# Patient Record
Sex: FEMALE | Race: WHITE | HISPANIC OR LATINO | Employment: FULL TIME | URBAN - METROPOLITAN AREA
[De-identification: names, ages, dates, MRNs, and addresses within clinical notes are randomized per-mention and may not be internally consistent; named-entity substitution may affect disease eponyms.]

---

## 2018-06-19 ENCOUNTER — CONVERSION ENCOUNTER (OUTPATIENT)
Dept: GENERAL RADIOLOGY | Facility: HOSPITAL | Age: 48
End: 2018-06-19

## 2021-05-05 ENCOUNTER — CONVERSION ENCOUNTER (OUTPATIENT)
Dept: ORTHOPEDIC SURGERY | Facility: CLINIC | Age: 51
End: 2021-05-05

## 2021-05-05 ENCOUNTER — OFFICE VISIT CONVERTED (OUTPATIENT)
Dept: ORTHOPEDIC SURGERY | Facility: CLINIC | Age: 51
End: 2021-05-05
Attending: STUDENT IN AN ORGANIZED HEALTH CARE EDUCATION/TRAINING PROGRAM

## 2021-05-14 ENCOUNTER — HOSPITAL ENCOUNTER (OUTPATIENT)
Dept: PERIOP | Facility: HOSPITAL | Age: 51
Setting detail: HOSPITAL OUTPATIENT SURGERY
Discharge: HOME | End: 2021-05-14
Attending: STUDENT IN AN ORGANIZED HEALTH CARE EDUCATION/TRAINING PROGRAM

## 2021-05-14 LAB — HCG UR QL: NEGATIVE

## 2021-05-22 ENCOUNTER — TRANSCRIBE ORDERS (OUTPATIENT)
Dept: ADMINISTRATIVE | Facility: HOSPITAL | Age: 51
End: 2021-05-22

## 2021-05-22 DIAGNOSIS — Z12.31 OTHER SCREENING MAMMOGRAM: Primary | ICD-10-CM

## 2021-06-02 ENCOUNTER — OFFICE VISIT CONVERTED (OUTPATIENT)
Dept: ORTHOPEDIC SURGERY | Facility: CLINIC | Age: 51
End: 2021-06-02
Attending: STUDENT IN AN ORGANIZED HEALTH CARE EDUCATION/TRAINING PROGRAM

## 2021-06-03 NOTE — PROCEDURES
Patient: LUIZA SALOMON     Acct: [id number]     Report: #[id number]  MR #:  [id number]     DOS: 2021 0811     : 1970  DICTATING: Reji Hernandes  ***Signed***  --------------------------------------------------------------------------------------------------------------------  Orthopedic Op/Procedure Note      Date       21            Pre-Operative Diagnosis:      Left shoulder adhesive capsulitis            Post-Operative Diagnosis:      Same as pre-op diagnosis            Surgeon/Assistants      Reji Hernandes MD            OR staff            Anesthesia      General, Block            Procedure Performed/Technique      Left shoulder manipulation under anesthesia, intra articular steroid injection            Specimen/Tissue Removed:      None            Findings:            Left shoulder adhesive capsulitis            Complications:      No            Estimated Blood Loss:      None            Procedure:      Indications: Luiza is a 50 year old female with left shoulder pain dating back     to 2020. Her exam was consistent with adhesive capsulitis. We     discussed treatment options including anti inflammatory medications, physical     therapy, intra articular injection, manipulation under anesthesia and possible     shoulder arthroscopy with capsular release. We discussed the procedural benefits    including improved shoulder motion and quicker return to function. We discussed     procedural risks including bleeding, infection, damage to nerves and blood     vessels, fracture, persistent pain, recurrent stiffness, anesthesia risk, DVT,     and need for additional procedures. After our discussion, Luiza elected to     proceed with left shoulder manipulation under anesthesia, intra articular     steroid injection, and possible arthroscopic capsular release. Written consent     was obtained.             Procedure: The patient was met in the pre operative holding area and the operat     tea extremity was marked. A pre operative peripheral nerve block was performed     by the anesthesia team. The patient was transported to the operative room.     General anesthesia was induced without complication. A timeout was taken to     confirm the appropriate patient, procedure, and procedural site.  All were in     agreement. Exam under anesthesia re demonstrated global loss of left shoulder     motion.  I stabilized the scapula and utilized the Codman's paradox technique to    perform a left shoulder manipulation. My hand was placed in the axilla to     minimize the lever arm. Palpable and audible release of scar/capsule was     appreciated. Post manipulation the left shoulder demonstrated 180 degrees of     elevation and 90 degrees of both internal and external rotation with the arm in     90 degrees of abduction. No expanding hematoma was appreciated. The patient had     a palpable radial pulse. I then cleaned the anterior shoulder with alcohol skin     prep. I performed an intra articular steroid injection with 9 cc of 1% lidocaine    and 80 mg depomedrol without complication. The patient awoke from anesthesia     without complication and was transferred to recovery in stable condition.             Post op: Therapy exercises reviewed. Patient to start formal PT today. DC sling     within one week. Follow up in 2 weeks for re evaluation.            Reji Hernandes                   May 14, 2021 08:05      Electronically signed by Reji Hernandes  05/14/2021 08:11     Disclaimer: Converted hospital document may not contain table formatting or lab diagrams. Please see [employer] for authenticated document.

## 2021-06-03 NOTE — PROCEDURES
Patient: LUIZA SALOMON     Acct: [id number]     Report: #[id number]  MR #:  [id number]     DOS: 2021 0728     : 1970  DICTATING: CAROLYN MCBRIDE  ***Signed***  --------------------------------------------------------------------------------------------------------------------  Anesthesia Blck Procedure Note      DATE: 21      Risks and benefits discussed and accepted with patient to include but not     limited to: bleeding, infection, paresthesia, pain at site of injection, limb     weakness, headaches, allergic reactions to injections, and seizures.            The block or continuous infusion is requested by the referring physician for     management of postoperative pain, or pain related to a procedure.      Diagnosis      Post-Op Pain Management      Block:  Single Interscalene      Site/Location:  Left      Prep Solution:  ChloraPrep      Technique:  Landmarks, Nerve Stimulator, Ultrasound (Deemed medically necessary)      Needle Type & Size:  Insulated 22 gauge Needle      Solution Injected:  0.5% Ropivacaine, 1:200,000 epi      Local Anesthetic Volume:  25 ml      Complications:  None      Comments:      Sterile technique used.  Negative IV test done. Negative heme with aspiration     every 5cc.  No paresthesia with block.  Patient tolerated well.            CAROLYN MCBRIDE                  May 14, 2021 07:28      Electronically signed by CAROLYN MCBRIDE  2021 07:28     Disclaimer: Converted hospital document may not contain table formatting or lab diagrams. Please see [employer] for authenticated document.

## 2021-06-05 NOTE — H&P
History and Physical      Patient Name: Jaimie George   Patient ID: [id number]   Sex: Female   YOB: 1970        Visit Date: May 5, 2021    Provider: Reji Hernandes MD   Location: [facility]   Location Address: [address] [place] [address]  [id number]   Location Phone: [phone]          Chief Complaint  · left shoulder pain      History Of Present Illness  Jaimie George is a 50 year old female who presents today to [facility].      Jaimie presents today for evaluation of her left shoulder.  She describes left shoulder pain that started in September of last year.  She denies any specific injury.  She played volleyball in December and feels like this may have aggravated her pain.  Her pain is along the lateral aspect of the shoulder and radiates down the arm.  She gets pain at night.  She has pain with any attempted reaching or pulling type activities.  She feels very stiff and feels like she has lost a lot of shoulder motion.  She denies any numbness.  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  She is a nondiabetic and a non-smoker.  She has not tried any formal treatment for her shoulder at this time.  She has never had any similar issues in the past.       Medication List  buspirone 5 mg oral tablet; Synthroid 112 mcg oral tablet         Allergy List  PENICILLINS       Allergies Reconciled  Social History  Tobacco (Never)         Review of Systems  · Constitutional  o Denies  o : fever, chills, weight loss  · Cardiovascular  o Denies  o : chest pain, shortness of breath  · Gastrointestinal  o Denies  o : liver disease, heartburn, nausea, blood in stools  · Genitourinary  o Denies  o : painful urination, blood in urine  · Integument  o Denies  o : rash, itching  · Neurologic  o Denies  o : headache, weakness, loss of consciousness  · Musculoskeletal  o Admits  o : Shoulder pain  · Psychiatric  o Denies  o : drug/alcohol addiction, anxiety,  "depression      Vitals  Date Time BP Position Site L\R Cuff Size HR RR TEMP (F) WT  HT  BMI kg/m2 BSA m2 O2 Sat FR L/min FiO2 HC       05/05/2021 03:01 PM      66 - R   152lbs 2oz 5'  7\" 23.83 1.81 99 %  21%          Physical Examination  · Constitutional  o Appearance  o : well developed, well-nourished, no obvious deformities present  · Head and Face  o Head  o :   § Inspection  § : normocephalic  o Face  o :   § Inspection  § : no facial lesions  · Eyes  o Conjunctivae  o : conjunctivae normal  o Sclerae  o : sclerae white  · Ears, Nose, Mouth and Throat  o Ears  o :   § External Ears  § : appearance within normal limits  § Hearing  § : intact  o Nose  o :   § External Nose  § : appearance normal  · Neck  o Inspection/Palpation  o : normal appearance  o Range of Motion  o : full range of motion  · Respiratory  o Respiratory Effort  o : breathing unlabored  o Inspection of Chest  o : normal appearance  o Auscultation of Lungs  o : no audible wheezing or rales  · Cardiovascular  o Heart  o : regular rate  · Gastrointestinal  o Abdominal Examination  o : soft and non-tender  · Skin and Subcutaneous Tissue  o General Inspection  o : intact, no rashes  · Psychiatric  o General  o : Alert and oriented x3  o Judgement and Insight  o : judgment and insight intact  o Mood and Affect  o : mood normal, affect appropriate  · Cervical Spine  o Inspection  o : No pain with cervical range of motion  · Right Shoulder  o Inspection  o : No areas of tenderness. Active elevation 180 degrees. Active external rotation to 50 degrees. Active internal rotation to the lower thoracic spine. 5 out of 5 with rotator cuff testing. Neurovascularly intact.  · Left Shoulder  o Inspection  o : Tender to palpation over the lateral shoulder. No swelling, bruising, or wounds. Active abduction to 30 degrees. Active internal rotation to the SI joint. -5 degrees of active external rotation. Passive motion equal to active motion. Unable to test rotator " cuff given range of motion limitations. No pain with passive elbow range of motion. Sensation intact light touch in the axillary, median, radial, ulnar nerve distributions. Motor function intact with AIN, PIN, ulnar function in the hand. Palpable radial pulse.  · In Office Procedures  o View  o : AP/Grashey/Axillary/Scap Y with template ball  o Site  o : left, shoulder   o Indication  o : Left shoulder pain   o Study  o : X-rays ordered, taken in the office, and reviewed today.  o Xray  o : Axillary, AP, Grashey, Scap Y views of the left shoulder obtained in the office today been reviewed. No fractures are noted. The glenohumeral joint is well aligned. Humeral height appears appropriate. Baseline AC joint arthritis changes. No other acute bony abnormality seen.  o Comparative Data  o : Previously obtained MRI have been reviewed.  · Imaging  o Imaging  o : MRI of the left shoulder from [facility] on 4/3/2021 has been reviewed. Partial-thickness tearing of the supraspinatus tendon at its insertion is noted. No tendon retraction is appreciable. Additional tendinopathy type changes in the infraspinatus tendon are seen. Capsular thickening noted diffusely. Labrum appears intact. Subdeltoid bursitis noted.          Assessment  · Left shoulder pain, unspecified chronicity     719.41/M25.512  · Frozen shoulder     726.0/M75.00  · Partial tear of rotator cuff     840.4/M75.110      Plan  · Orders  o Shoulder (Left) [facility] Preferred View ([id number]) - 719.41/M25.512 - 05/05/2021  · Instructions  o X-rays reviewed by Dr. Hernandes.  o Reviewed the patient's Past Medical, Social, and Family history as well as the ROS at today's visit, no changes.  o Call or return if worsening symptoms.  o Discussed surgery.  o Surgery pamphlet given.  o Jaimie has left shoulder adhesive capsulitis. She also has partial tearing of the supraspinatus tendon noted on her MRI. She is severely limited in her motion as noted above. We discussed  multiple treatment options. We discussed physical therapy and anti-inflammatory medications. We discussed a shoulder manipulation. We discussed an intra-articular shoulder injection. We discussed surgical management with a arthroscopic capsular release. Given Jaimie's duration of symptoms she would like to proceed with a formal intervention. She is interested in a manipulation and steroid injection. She is agreeable to arthroscopic capsular release if adequate motion is not obtained through the manipulation. We will plan for left shoulder manipulation under anesthesia, possible intra-articular steroid injection, and possible arthroscopic capsular release. We discussed the benefit of a manipulation/surgery is improving her shoulder motion. She will require formal physical therapy, likely for 5 days a week initially, to maintain her motion post procedurally. We discussed procedural risks including fracture, persistent pain, recurrent stiffness, bleeding, infection, damage to nerves and blood vessels, chondral injury, anesthesia risk, DVT, need for additional procedures. She again elected to proceed. We will not address the rotator cuff as the recovery/rehab is very different in that setting. If this becomes problematic we can reconsider management of her rotator cuff in the future. We will work on scheduling and coordinating therapy postoperatively.  o Electronically Identified Patient Education Materials Provided Electronically            Electronically Signed by: Reji Hernandes MD -Author on May 7, 2021 01:26:03 PM

## 2021-06-23 ENCOUNTER — OFFICE VISIT (OUTPATIENT)
Dept: ORTHOPEDIC SURGERY | Facility: CLINIC | Age: 51
End: 2021-06-23

## 2021-06-23 VITALS — HEART RATE: 83 BPM | WEIGHT: 148 LBS | OXYGEN SATURATION: 100 % | HEIGHT: 64 IN | BODY MASS INDEX: 25.27 KG/M2

## 2021-06-23 DIAGNOSIS — M25.512 LEFT SHOULDER PAIN, UNSPECIFIED CHRONICITY: ICD-10-CM

## 2021-06-23 DIAGNOSIS — M75.02 ADHESIVE CAPSULITIS OF LEFT SHOULDER: Primary | ICD-10-CM

## 2021-06-23 PROCEDURE — 99024 POSTOP FOLLOW-UP VISIT: CPT | Performed by: STUDENT IN AN ORGANIZED HEALTH CARE EDUCATION/TRAINING PROGRAM

## 2021-06-23 RX ORDER — LEVOTHYROXINE SODIUM 112 UG/1
112 TABLET ORAL DAILY
COMMUNITY
Start: 2021-03-29

## 2021-06-23 RX ORDER — BUSPIRONE HYDROCHLORIDE 5 MG/1
5 TABLET ORAL 2 TIMES DAILY
COMMUNITY
Start: 2021-06-10

## 2021-06-23 NOTE — PROGRESS NOTES
"Chief Complaint  Pain of the Left Shoulder    Subjective          Jaimie George presents to [facility] for   History of Present Illness    Jaimie returns today for follow-up of her left shoulder.  She has left shoulder adhesive capsulitis and underwent a manipulation on 5/14/2021.  Her motion has been improving.  She is still having pain and difficulty with active motion.  Has been attending physical therapy.  Is using Voltaren and tolerating it well.  She denies any numbness.  Denies any swelling.    Allergies   Allergen Reactions   • Latex Swelling   • Penicillins Swelling   • Fumaric Acid-K Bromide-Nickel [Traumeel] Rash        Social History     Socioeconomic History   • Marital status:      Spouse name: Not on file   • Number of children: Not on file   • Years of education: Not on file   • Highest education level: Not on file   Tobacco Use   • Smoking status: Never Smoker   Substance and Sexual Activity   • Alcohol use: Yes     Comment: ocasionaly drinks, 1 drink per day, has been drinking 6-10 years    • Drug use: Never        REVIEW OF SYSTEMS    Constitutional: Denies fevers, chills, weight loss  Cardiovascular: Denies chest pain, shortness of breath  Skin: Denies rashes, acute skin changes  Neurologic: Denies headache, loss of consciousness  MSK: Left shoulder pain      Objective   Vital Signs:   Pulse 83   Ht 162.6 cm (64\")   Wt 67.1 kg (148 lb)   SpO2 100%   BMI 25.40 kg/m²     Body mass index is 25.4 kg/m².    Physical Exam    General: Alert, no acute distress  Left upper extremity: No swelling about the left shoulder.  Tender to palpation over the anterior shoulder.  No bruising.  Passive elevation to 135 degrees, passive external rotation of 40 degrees, passive internal rotation to the SI joint.  5 out of 5 with resisted shoulder abduction.  5 out of 5 with resisted external rotation with the arm at the side.  Sensation intact in the axillary, median, radial, " ulnar nerve distributions.  Motor function intact with AIN, PIN, ulnar nerve function.  Palpable radial pulse.    Procedures    Imaging Results (Most Recent)     None           Result Review :             Assessment and Plan    Diagnoses and all orders for this visit:    1. Adhesive capsulitis of left shoulder (Primary)    2. Left shoulder pain, unspecified chronicity  -     diclofenac (VOLTAREN) 50 MG EC tablet; Take 1 tablet by mouth 2 (Two) Times a Day.  Dispense: 60 tablet; Refill: 1        Jaimie has left shoulder adhesive capsulitis.  She is now approximately 6 weeks status post a manipulation.  Her motion has significantly improved from prior to the manipulation.  She is still painful.  We discussed this is not unexpected given the severity of her initial functional limitations.  A new prescription for Voltaren was provided.  She will continue formal physical therapy.  A work note was provided.  Follow-up in 3 weeks for reevaluation.  No additional x-rays are needed when she returns.      Follow Up   Return in about 3 weeks (around 7/14/2021).  Patient was given instructions and counseling regarding her condition or for health maintenance advice. Please see specific information pulled into the AVS if appropriate.

## 2021-06-24 PROBLEM — M75.02 ADHESIVE CAPSULITIS OF LEFT SHOULDER: Status: ACTIVE | Noted: 2021-06-24

## 2021-07-15 VITALS — OXYGEN SATURATION: 99 % | BODY MASS INDEX: 23.88 KG/M2 | HEIGHT: 67 IN | WEIGHT: 152.12 LBS | HEART RATE: 66 BPM

## 2021-07-15 VITALS — OXYGEN SATURATION: 99 % | BODY MASS INDEX: 25.84 KG/M2 | WEIGHT: 151.37 LBS | HEIGHT: 64 IN | HEART RATE: 67 BPM

## 2021-07-19 ENCOUNTER — OFFICE VISIT (OUTPATIENT)
Dept: ORTHOPEDIC SURGERY | Facility: CLINIC | Age: 51
End: 2021-07-19

## 2021-07-19 VITALS — BODY MASS INDEX: 25.27 KG/M2 | OXYGEN SATURATION: 99 % | HEIGHT: 64 IN | HEART RATE: 66 BPM | WEIGHT: 148 LBS

## 2021-07-19 DIAGNOSIS — M75.02 ADHESIVE CAPSULITIS OF LEFT SHOULDER: Primary | ICD-10-CM

## 2021-07-19 PROCEDURE — 99024 POSTOP FOLLOW-UP VISIT: CPT | Performed by: STUDENT IN AN ORGANIZED HEALTH CARE EDUCATION/TRAINING PROGRAM

## 2021-07-19 NOTE — PROGRESS NOTES
"Chief Complaint  Pain of the Left Shoulder    Subjective          Jaimie George presents to [facility] for follow up on adhesive capsulitis of left shoulder.   History of Present Illness    On 5/14/21 patient had manipulation completed. Continuing therapy 2 days a week.  She states that her fingers continue to go numb with more strenuous activity, but this is intermittent. Pain continuing in left shoulder.  Using oral Voltaren with mild relief.  Patient states left shoulder stiffness feels stable compared to the previous evaluation.. Daily activities continued to be impacted due to pain in left shoulder.   Allergies   Allergen Reactions   • Latex Swelling   • Penicillins Swelling   • Fumaric Acid-K Bromide-Nickel [Traumeel] Rash        Social History     Socioeconomic History   • Marital status:      Spouse name: Not on file   • Number of children: Not on file   • Years of education: Not on file   • Highest education level: Not on file   Tobacco Use   • Smoking status: Never Smoker   • Smokeless tobacco: Never Used   Vaping Use   • Vaping Use: Never used   Substance and Sexual Activity   • Alcohol use: Yes     Comment: ocasionaly drinks, 1 drink per day, has been drinking 6-10 years    • Drug use: Never        REVIEW OF SYSTEMS    Constitutional: Denies fevers, chills, weight loss  Cardiovascular: Denies chest pain, shortness of breath  Skin: Denies rashes, acute skin changes  Neurologic: Denies headache, loss of consciousness  MSK: Pain of left shoulder      Objective   Vital Signs:   Pulse 66   Ht 162.6 cm (64\")   Wt 67.1 kg (148 lb)   SpO2 99%   BMI 25.40 kg/m²     Body mass index is 25.4 kg/m².    Physical Exam    General: Alert, no acute distress   Left upper extremity: Tender over bicep tendon, lateral shoulder, and trapezius muscles. Full elbow flexion and extension. Passively gets to 110 degrees elevation. Active elevation to 80 degrees. Passive external rotation 30 " degrees. Internal rotation to waistline. Sensation intact in the axillary, median, radial, ulnar nerve distributions. Palpable radial pulse.  Full ROM of digits.     Procedures    Imaging Results (Most Recent)     None           Result Review :               Assessment and Plan    Diagnoses and all orders for this visit:    1. Adhesive capsulitis of left shoulder (Primary)  -     naproxen (NAPROSYN) 500 MG tablet; Take 1 tablet by mouth 2 (Two) Times a Day.  Dispense: 60 tablet; Refill: 0          Jaimie remains painful now approximately 2 months status post manipulation and intra-articular injection for left shoulder adhesive capsulitis.  We will switch from Voltaren to naproxen today.  Continuing with therapy. Discussed various options to help decrease or prevent stiffness. Patient expressed understanding. She is to remain off work until the next follow-up visit.  Discussed potential injection next visit. Follow up in 4 weeks.  No x-rays needed when she returns.    Follow Up   Return in about 4 weeks (around 8/16/2021).     Follow up in 4 weeks.  Patient was given instructions and counseling regarding her condition or for health maintenance advice. Please see specific information pulled into the AVS if appropriate.

## 2021-07-21 ENCOUNTER — TELEPHONE (OUTPATIENT)
Dept: ORTHOPEDIC SURGERY | Facility: CLINIC | Age: 51
End: 2021-07-21

## 2021-07-22 RX ORDER — NAPROXEN 500 MG/1
500 TABLET ORAL 2 TIMES DAILY
Qty: 60 TABLET | Refills: 0 | Status: SHIPPED | OUTPATIENT
Start: 2021-07-22 | End: 2021-09-20

## 2021-08-06 ENCOUNTER — HOSPITAL ENCOUNTER (OUTPATIENT)
Dept: MAMMOGRAPHY | Facility: HOSPITAL | Age: 51
Discharge: HOME | End: 2021-08-06
Admitting: INTERNAL MEDICINE

## 2021-08-06 DIAGNOSIS — Z12.31 OTHER SCREENING MAMMOGRAM: ICD-10-CM

## 2021-08-06 PROCEDURE — 77063 BREAST TOMOSYNTHESIS BI: CPT

## 2021-08-06 PROCEDURE — 77067 SCR MAMMO BI INCL CAD: CPT

## 2021-08-06 PROCEDURE — 77063 BREAST TOMOSYNTHESIS BI: CPT | Performed by: RADIOLOGY

## 2021-08-06 PROCEDURE — 77067 SCR MAMMO BI INCL CAD: CPT | Performed by: RADIOLOGY

## 2021-08-23 ENCOUNTER — OFFICE VISIT (OUTPATIENT)
Dept: ORTHOPEDIC SURGERY | Facility: CLINIC | Age: 51
End: 2021-08-23

## 2021-08-23 VITALS — HEIGHT: 64 IN | BODY MASS INDEX: 25.78 KG/M2 | HEART RATE: 87 BPM | WEIGHT: 151 LBS | OXYGEN SATURATION: 99 %

## 2021-08-23 DIAGNOSIS — M25.512 LEFT SHOULDER PAIN, UNSPECIFIED CHRONICITY: ICD-10-CM

## 2021-08-23 DIAGNOSIS — M75.02 ADHESIVE CAPSULITIS OF LEFT SHOULDER: Primary | ICD-10-CM

## 2021-08-23 PROCEDURE — 99213 OFFICE O/P EST LOW 20 MIN: CPT | Performed by: STUDENT IN AN ORGANIZED HEALTH CARE EDUCATION/TRAINING PROGRAM

## 2021-08-23 RX ORDER — METHYLPREDNISOLONE 4 MG/1
1 TABLET ORAL DAILY
Qty: 21 TABLET | Refills: 0 | Status: SHIPPED | OUTPATIENT
Start: 2021-08-23 | End: 2022-06-03

## 2021-08-23 NOTE — PROGRESS NOTES
"Chief Complaint  Follow-up of the Left Shoulder    Subjective          Jaimie George presents to [facility] for   History of Present Illness    Jaimie returns today for left shoulder adhesive capsulitis. She had a manipulation on 5/14/21. States she feels like she is making progress with shoulder elevation. States she is continuing to have pain. Going to therapy 2 times a week. Continuing to use naproxen with no complications but states she feels like diclofenac works better for her. States she is on a 2 lb restriction at therapy until she progresses. States she is still having difficulty with opening and shutting car doors. She states numbness and tingling in fingers with pushing or wrist extension that is intermittent.  She has yet to return to work.    Allergies   Allergen Reactions   • Latex Swelling   • Penicillins Swelling   • Fumaric Acid-K Bromide-Nickel [Traumeel] Rash        Social History     Socioeconomic History   • Marital status:      Spouse name: Not on file   • Number of children: Not on file   • Years of education: Not on file   • Highest education level: Not on file   Tobacco Use   • Smoking status: Never Smoker   • Smokeless tobacco: Never Used   Vaping Use   • Vaping Use: Never used   Substance and Sexual Activity   • Alcohol use: Yes     Comment: ocasionaly drinks, 1 drink per day, has been drinking 6-10 years    • Drug use: Never        I reviewed the patient's chief complaint, history of present illness, review of systems, past medical history, surgical history, family history, social history, medications, and allergy list.     REVIEW OF SYSTEMS    Constitutional: Denies fevers, chills, weight loss  Cardiovascular: Denies chest pain, shortness of breath  Skin: Denies rashes, acute skin changes  Neurologic: Denies headache, loss of consciousness  MSK: follow up of left shoulder      Objective   Vital Signs:   Pulse 87   Ht 162.6 cm (64\")   Wt 68.5 kg " (151 lb)   SpO2 99%   BMI 25.92 kg/m²     Body mass index is 25.92 kg/m².    Physical Exam    General: Alert, no acute distress   Left upper extremity: no swelling or bruising.  Diffuse nonspecific soft tissue tenderness about the shoulder. Active elevation to 90 degrees. Passive elevation 120 degrees. Passive external rotation to 20 degrees. Internal rotation to back pocket. Sensation intact in the axillary, median, radial, ulnar nerve distributions. Palpable radial pulse. Full elbow flexion and extension. 90 degrees pronation and supination.  Full motor function in the AIN, PIN, ulnar nerve distributions.    Procedures    Imaging Results (Most Recent)     None            Assessment and Plan    Diagnoses and all orders for this visit:    1. Adhesive capsulitis of left shoulder (Primary)  -     methylPREDNISolone (MEDROL) 4 MG dose pack; Take 1 tablet by mouth Daily. Use as directed by package instructions  Dispense: 21 tablet; Refill: 0    2. Left shoulder pain, unspecified chronicity        Jaimie had a shoulder manipulation 3.5 months ago for adhesive capsulitis.  She continues to improve with therapy.  We discussed medication adjustments.  We discussed a Medrol Dosepak. Discussed the risks and benefits with the patient of the medication. Patient expressed understanding and wished to go forward with medication. Advised not to take any other antiinflammatories while on medrol dose pack. Continue with therapy. Wrote medrol dose pack order. Wrote work note to be off until reevaluation. Follow up in 4 weeks for reevaluation.       Scribed for Reji Hernandes MD by Mabel Ugalde MA.  08/23/21   08:37 EDT    Follow Up   Return in about 4 weeks (around 9/20/2021).  Patient was given instructions and counseling regarding her condition or for health maintenance advice. Please see specific information pulled into the AVS if appropriate.

## 2021-09-20 ENCOUNTER — OFFICE VISIT (OUTPATIENT)
Dept: ORTHOPEDIC SURGERY | Facility: CLINIC | Age: 51
End: 2021-09-20

## 2021-09-20 VITALS — BODY MASS INDEX: 26.19 KG/M2 | RESPIRATION RATE: 14 BRPM | WEIGHT: 153.4 LBS | HEIGHT: 64 IN

## 2021-09-20 DIAGNOSIS — M75.02 ADHESIVE CAPSULITIS OF LEFT SHOULDER: Primary | ICD-10-CM

## 2021-09-20 PROCEDURE — 99213 OFFICE O/P EST LOW 20 MIN: CPT | Performed by: STUDENT IN AN ORGANIZED HEALTH CARE EDUCATION/TRAINING PROGRAM

## 2021-09-20 NOTE — PROGRESS NOTES
"Chief Complaint  Pain of the Left Shoulder    Subjective          Jaimie George presents to [facility] for   History of Present Illness    Jaimie returns today for left shoulder adhesive capsulitis. She had a manipulation on 5/14/21. Reports she went to [place] for vacation and used hyperbaric chamber therapy with her . States she felt like it helped. Has been going to formal therapy 3 times a week. Pain has decreased since last evaluation but continues to report stiffness.  She denies any new injuries.    Allergies   Allergen Reactions   • Latex Swelling   • Penicillins Swelling   • Fumaric Acid-K Bromide-Nickel [Traumeel] Rash        Social History     Socioeconomic History   • Marital status:      Spouse name: Not on file   • Number of children: Not on file   • Years of education: Not on file   • Highest education level: Not on file   Tobacco Use   • Smoking status: Never Smoker   • Smokeless tobacco: Never Used   Vaping Use   • Vaping Use: Never used   Substance and Sexual Activity   • Alcohol use: Yes     Comment: ocasionaly drinks, 1 drink per day, has been drinking 6-10 years    • Drug use: Never        I reviewed the patient's chief complaint, history of present illness, review of systems, past medical history, surgical history, family history, social history, medications, and allergy list.     REVIEW OF SYSTEMS    Constitutional: Denies fevers, chills, weight loss  Cardiovascular: Denies chest pain, shortness of breath  Skin: Denies rashes, acute skin changes  Neurologic: Denies headache, loss of consciousness  MSK: Left shoulder pain      Objective   Vital Signs:   Resp 14   Ht 162.6 cm (64\")   Wt 69.6 kg (153 lb 6.4 oz)   BMI 26.33 kg/m²     Body mass index is 26.33 kg/m².    Physical Exam    General: Alert, no acute distress  Left upper extremity: Active assisted elevation 120 degrees. External rotation to 30 degrees. Internal rotation waistline. Sensation " intact in the axillary, median, radial, ulnar nerve distributions. Palpable radial pulse. Full active finger ROM.  Full active elbow flexion and extension.    Procedures    Imaging Results (Most Recent)     None             Assessment and Plan    Diagnoses and all orders for this visit:    1. Adhesive capsulitis of left shoulder (Primary)        Jaimie had a shoulder manipulation 4.5 months ago for adhesive capsulitis.  Her motion and pain continue to slowly improve.  We discussed additional treatment options with repeat injections and possible repeat MRI if she does not continue to improve.  Continue to remain off work. Wrote work note. Wrote order for diclofenac. Continue with formal therapy. Follow up in 4 weeks to reevaluate. No new xrays are needed next visit.     Scribed for Reji Hernandes MD by Mabel Ugalde MA.  09/20/21   08:46 EDT    Follow Up   Return in about 4 weeks (around 10/18/2021).  Patient was given instructions and counseling regarding her condition or for health maintenance advice. Please see specific information pulled into the AVS if appropriate.

## 2021-10-20 ENCOUNTER — OFFICE VISIT (OUTPATIENT)
Dept: ORTHOPEDIC SURGERY | Facility: CLINIC | Age: 51
End: 2021-10-20

## 2021-10-20 VITALS — BODY MASS INDEX: 26.29 KG/M2 | WEIGHT: 154 LBS | HEIGHT: 64 IN | OXYGEN SATURATION: 99 % | HEART RATE: 79 BPM

## 2021-10-20 DIAGNOSIS — M75.02 ADHESIVE CAPSULITIS OF LEFT SHOULDER: ICD-10-CM

## 2021-10-20 DIAGNOSIS — R20.0 NUMBNESS AND TINGLING IN LEFT HAND: Primary | ICD-10-CM

## 2021-10-20 DIAGNOSIS — R20.2 NUMBNESS AND TINGLING IN LEFT HAND: Primary | ICD-10-CM

## 2021-10-20 PROCEDURE — 99213 OFFICE O/P EST LOW 20 MIN: CPT | Performed by: STUDENT IN AN ORGANIZED HEALTH CARE EDUCATION/TRAINING PROGRAM

## 2021-10-20 RX ORDER — LEVOTHYROXINE SODIUM 112 UG/1
TABLET ORAL EVERY 24 HOURS
COMMUNITY

## 2021-10-20 RX ORDER — CETIRIZINE HYDROCHLORIDE 10 MG/1
TABLET ORAL
COMMUNITY
Start: 2021-10-18

## 2021-10-20 NOTE — PROGRESS NOTES
Chief Complaint  Follow-up of the Left Shoulder    Subjective          Jaimie George presents to [facility] for   History of Present Illness    Jaimie returns today for left shoulder adhesive capsulitis. She had a manipulation on 5/14/21.   Patient states that her shoulder has not improved since her previous visit.  She is experiencing numbness and tingling in her fingers along with shoulder pain.  She describes a coldness sensation in her hands on occasion that limit her ability to use her hand.  Her numbness is along the median nerve distribution predominately but with continued elevation of her arm the numbness will radiate into her whole hand.  Patient has continue with physical therapy to work on her range of motion.  Physical therapy noted 136 degrees flexion, 120 degrees abduction, 35 degrees internal rotation, and 45 degrees external rotation.  Patient has continued taking Voltaren for pain.      Allergies   Allergen Reactions   • Latex Swelling   • Penicillins Swelling   • Fumaric Acid-K Bromide-Nickel [Traumeel] Rash        Social History     Socioeconomic History   • Marital status:    Tobacco Use   • Smoking status: Never Smoker   • Smokeless tobacco: Never Used   Vaping Use   • Vaping Use: Never used   Substance and Sexual Activity   • Alcohol use: Yes     Comment: ocasionaly drinks, 1 drink per day, has been drinking 6-10 years    • Drug use: Never        I reviewed the patient's chief complaint, history of present illness, review of systems, past medical history, surgical history, family history, social history, medications, and allergy list.     REVIEW OF SYSTEMS    Constitutional: Denies fevers, chills, weight loss  Cardiovascular: Denies chest pain, shortness of breath  Skin: Denies rashes, acute skin changes  Neurologic: Denies headache, loss of consciousness  MSK: Left shoulder pain.  Left hand numbness.      Objective   Vital Signs:   Pulse 79   Ht 162.6 cm  "(64\")   Wt 69.9 kg (154 lb)   SpO2 99%   BMI 26.43 kg/m²     Body mass index is 26.43 kg/m².    Physical Exam    General: Alert, no acute distress  Left upper extremity: Tenderness over the biceps tendon in the groove anteriorly. Tenderness over the lateral shoulder Tenderness over the trapezius muscle posteriorly. Active elevation to 60 degrees. Assisted elevation to 130 degrees. External rotation to 45. Internal rotation to waistline.  5 out of 5 rotator cuff testing with pain on supraspinatus and infraspinatus testing.  5 out of 5 subscapular testing.  Sensation intact in the axillary, median, radial, and ulnar nerve distributions.  Full finger flexion and extension.  No atrophy noted.  Increased tingling in the hand with Phalen's compression.  Intact palmar abduction of the thumb thumb.  Thumb opposition intact.  Full elbow flexion extension.  Palpable radial pulse.       Procedures    Imaging Results (Most Recent)     None               Assessment and Plan    Diagnoses and all orders for this visit:    1. Numbness and tingling in left hand (Primary)  -     Ambulatory Referral to Neurology  -     EMG & Nerve Conduction Test; Future    2. Adhesive capsulitis of left shoulder  -     MRI Shoulder Left Without Contrast; Future          Jaimie had a shoulder manipulation  on 05/14/2021.  She has improved but her shoulder remains stiff and painful.  Patient describes numbness and tingling sensation in her left hand.  Discussion with patient about nerve conduction test to look for further pathology of left hand numbness.  Patient advised to hold off on physical therapy until after MRI and EMG are complete.  MRI and EMG ordered today.  Patient to remain off work.  Follow-up after MRI and EMG.  No new x-rays are needed at next visit.    Call or return if symptoms worsen or patient has any concerns.     Scribed for Reji Hernandes MD by Reji Hernandes MD  10/20/2021   08:47 EDT         Follow Up   Return for MRI and EMG " results .  Patient was given instructions and counseling regarding her condition or for health maintenance advice. Please see specific information pulled into the AVS if appropriate.       I have personally performed the services described in this document as scribed by the above individual and it is both accurate and complete.     eRji Hernandes MD  10/22/21  12:07 EDT

## 2021-11-10 ENCOUNTER — HOSPITAL ENCOUNTER (OUTPATIENT)
Dept: MRI IMAGING | Facility: HOSPITAL | Age: 51
Discharge: HOME | End: 2021-11-10
Admitting: STUDENT IN AN ORGANIZED HEALTH CARE EDUCATION/TRAINING PROGRAM

## 2021-11-10 ENCOUNTER — PROCEDURE VISIT (OUTPATIENT)
Dept: NEUROLOGY | Facility: CLINIC | Age: 51
End: 2021-11-10

## 2021-11-10 VITALS
HEART RATE: 86 BPM | WEIGHT: 150 LBS | DIASTOLIC BLOOD PRESSURE: 82 MMHG | SYSTOLIC BLOOD PRESSURE: 153 MMHG | BODY MASS INDEX: 25.61 KG/M2 | HEIGHT: 64 IN

## 2021-11-10 DIAGNOSIS — R20.2 NUMBNESS AND TINGLING IN LEFT HAND: ICD-10-CM

## 2021-11-10 DIAGNOSIS — R20.0 NUMBNESS AND TINGLING IN LEFT HAND: ICD-10-CM

## 2021-11-10 DIAGNOSIS — M75.02 ADHESIVE CAPSULITIS OF LEFT SHOULDER: ICD-10-CM

## 2021-11-10 PROCEDURE — 95886 MUSC TEST DONE W/N TEST COMP: CPT | Performed by: PSYCHIATRY & NEUROLOGY

## 2021-11-10 PROCEDURE — 99202 OFFICE O/P NEW SF 15 MIN: CPT | Performed by: PSYCHIATRY & NEUROLOGY

## 2021-11-10 PROCEDURE — 95908 NRV CNDJ TST 3-4 STUDIES: CPT | Performed by: PSYCHIATRY & NEUROLOGY

## 2021-11-10 PROCEDURE — 73221 MRI JOINT UPR EXTREM W/O DYE: CPT

## 2021-11-10 NOTE — PROGRESS NOTES
"Chief Complaint  Numbness (LUE), Pain (LUE), and Extremity Weakness (LUE)    Subjective          Jaimie George is a 50 y.o. female who presents to [facility]  History of Present Illness  50-year-old woman evaluated for left arm and hand numbness.  She states that she injured her left shoulder while a goat was chasing her.  She has injured her left shoulder and her shoulder has been manipulated by orthopedic surgery.  She states that she did not have a dislocated shoulder.  She states that there is limited range of motion to her left shoulder and she has undergone physical therapy 48 times.  She states that her left hand gets numb in the pinky, ring finger and middle finger every time she moves her left shoulder.  It happens several times a day.  She states that the left shoulder wakes her up at night because it is painful.  She is here today for nerve conduction EMG study.  Objective   Vital Signs:   /82   Pulse 86   Ht 162.6 cm (64\")   Wt 68 kg (150 lb)   BMI 25.75 kg/m²     Physical Exam   There is no weakness of the left upper extremity on individual muscle testing.  There is no atrophy of the deltoids, biceps, triceps, pronators, supinators, extensors axis of the wrist and fingers and intrinsic hand muscles.  Reflexes are normoactive and symmetrical.  There is significant limitation of range of motion of the left shoulder to abduction, external rotation, reaching her back.        Assessment and Plan  Diagnoses and all orders for this visit:    1. Numbness and tingling in left hand  Assessment & Plan:  Nerve conduction study and EMG study is normal and does not show electrophysiologic evidence for entrapment neuropathy at the elbow or wrist.  There is no evidence of brachial plexopathy.  There is no evidence electrophysiologically or clinically that there cervical radiculopathy.  I discussed with her that her left hand numbness and tingling is referred " symptomatology from her left shoulder pain.  She is to follow-up with orthopedic surgery.    Orders:  -     EMG & Nerve Conduction Test       Nerve Conduction Study:  4 nerves     EMG:  Complete    Total time spent with the patient and coordinating patient care was 25 minutes.    Follow Up  No follow-ups on file.  Patient was given instructions and counseling regarding her condition or for health maintenance advice. Please see specific information pulled into the AVS if appropriate.

## 2021-11-10 NOTE — ASSESSMENT & PLAN NOTE
Nerve conduction study and EMG study is normal and does not show electrophysiologic evidence for entrapment neuropathy at the elbow or wrist.  There is no evidence of brachial plexopathy.  There is no evidence electrophysiologically or clinically that there cervical radiculopathy.  I discussed with her that her left hand numbness and tingling is referred symptomatology from her left shoulder pain.  She is to follow-up with orthopedic surgery.

## 2021-12-06 ENCOUNTER — OFFICE VISIT (OUTPATIENT)
Dept: ORTHOPEDIC SURGERY | Facility: CLINIC | Age: 51
End: 2021-12-06

## 2021-12-06 VITALS — HEART RATE: 84 BPM | BODY MASS INDEX: 26.29 KG/M2 | OXYGEN SATURATION: 100 % | WEIGHT: 154 LBS | HEIGHT: 64 IN

## 2021-12-06 DIAGNOSIS — R20.0 NUMBNESS AND TINGLING IN LEFT HAND: ICD-10-CM

## 2021-12-06 DIAGNOSIS — M75.02 ADHESIVE CAPSULITIS OF LEFT SHOULDER: Primary | ICD-10-CM

## 2021-12-06 DIAGNOSIS — R20.2 NUMBNESS AND TINGLING IN LEFT HAND: ICD-10-CM

## 2021-12-06 PROCEDURE — 99213 OFFICE O/P EST LOW 20 MIN: CPT | Performed by: STUDENT IN AN ORGANIZED HEALTH CARE EDUCATION/TRAINING PROGRAM

## 2021-12-06 NOTE — PROGRESS NOTES
"Chief Complaint  Follow-up of the Left Shoulder    Subjective          Jaimie George presents to [facility] for a follow-up of left shoulder.     History of Present Illness    Patient is being treated for left adhesive capsulitis. She is s/p left shoulder manipulation performed on 5/14/21. She has been failing in improvement with her shoulder range of motion and pain. She also has numbness and tingling in the left hand alongside the shoulder pain she is experiencing. Therapy had been placed on hold until she obtained an MRI and EMG. She is present today with MRI results of the left shoulder and EMG results of the left hand. She states her symptoms remained the same since her last visit with us. She states she has trouble with holding onto and gripping items. She continues to drop objects due to shoulder pain and numbness and tingling.     Allergies   Allergen Reactions   • Latex Swelling   • Penicillins Swelling   • Fumaric Acid-K Bromide-Nickel [Traumeel] Rash        Social History     Socioeconomic History   • Marital status:    Tobacco Use   • Smoking status: Never Smoker   • Smokeless tobacco: Never Used   Vaping Use   • Vaping Use: Never used   Substance and Sexual Activity   • Alcohol use: Yes     Comment: ocasionaly drinks, 1 drink per day, has been drinking 6-10 years    • Drug use: Never        I reviewed the patient's chief complaint, history of present illness, review of systems, past medical history, surgical history, family history, social history, medications, and allergy list.     REVIEW OF SYSTEMS    Constitutional: Denies fevers, chills, weight loss  Cardiovascular: Denies chest pain, shortness of breath  Skin: Denies rashes, acute skin changes  Neurologic: Denies headache, loss of consciousness  MSK: Left shoulder pain, left hand numbness and tingling     Objective   Vital Signs:   Pulse 84   Ht 162.6 cm (64\")   Wt 69.9 kg (154 lb)   SpO2 100%   BMI 26.43 " kg/m²     Body mass index is 26.43 kg/m².    Physical Exam    LEFT SHOULDER: Tender biceps tendon. Tender AC joint. Assisted elevation to 110 degrees. ER to 30 degrees. IR to lower lumbar spine. 4/5 RTC testing with moderate pain. Positive impingement testing.     LEFT HAND: Intact palmar abduction of the thumb thumb.  Thumb opposition intact.  Full elbow flexion extension. Full wrist flexion and extension. Sensation intact in the median, radial, ulnar nerve distributions.  Palpable radial pulse.     Procedures    Imaging Results (Most Recent)     None         PROCEDURE:  MRI SHOULDER LEFT WO CONTRAST     COMPARISON: [facility] , CR, SHOULDER >OR= 2V LT, 5/05/2021, 15:57.  [facility] ,   CR, SHOULDER >OR= 2V LT, 6/02/2021, 15:18.  Other, MR, MRI SHOULDER LEFT WO CONTRAST, 4/03/2021,   10:01.     INDICATIONS:  LEFT SHOULDER PAIN AND LIMITED RANGE OF MOTION. LEFT ARM NUMBNESS AND TINGLING. NO   KNOWN INJURY. FROZEN SHOULDER SURGERY MAY 2021.                         TECHNIQUE:    A variety of imaging planes and parameters were utilized for visualization of suspected   pathology.  Images were performed without contrast.       FINDINGS:          There is tendinopathy of the supraspinatus.  There is focal hyperintense signal involving the   distal bursal surface supraspinatus tendon fibers, similar to the prior exam.  There is some   increased signal in the distal supraspinatus tendon suspected to represent tendinopathy.  There is   a small focus of hyperintense signal seen on coronal series 9, image 10 and sagittal series 10,   image 15 along the articular surface of the anterior supraspinatus at the critical zone which could   represent a new small articular surface defect.  No other definite new tendon defect or   full-thickness defect is seen.     There is mild infraspinatus tendinopathy without definite tear.  The subscapularis tendon also   demonstrates suspected tendinopathy without definite tear.   The teres minor tendon appears intact.    There does not appear to be significant rotator cuff muscle edema.  No significant rotator cuff or   deltoid muscle atrophy is seen.  There is a small amount of fluid in the subacromial/subdeltoid   bursa.     Glenohumeral alignment appears within normal limits.  No significant joint effusion.  There is   suggestion of mild glenohumeral osteophyte formation and possible partial thickness cartilage loss   suggesting mild osteoarthritis.  There is mild distortion and diminution of the posterior labrum   suggesting degeneration without discrete focal labral defect or paralabral cyst visualized on this   exam.     There is tendinopathy of the long head of the biceps tendon.  Biceps tendon appears normally   positioned in the bicipital groove.  There is a small amount of fluid in the biceps tendon sheath.    No definite high-grade biceps tendon tear.     No evidence of fracture or suspicious marrow signal abnormality.  Acromioclavicular joint alignment   is within normal limits.  There appear to be mild age related degenerative changes at the   acromioclavicular joint without significant periarticular edema.  There is mild enthesopathy at the   greater and lesser tuberosities.  No axillary adenopathy is seen.     CONCLUSION:   1. Supraspinatus tendinopathy with possible new small partial thickness articular surface tear as   above.  There is also suspected bursal surface defect at the distal supraspinatus, as before.  2. Small amount of fluid in the subacromial/subdeltoid bursa which may indicate mild bursitis.  3. Small amount of fluid in the biceps tendon sheath which may indicate mild tenosynovitis or   capsulitis.  4. Suspected mild glenohumeral osteoarthritis with degeneration of the posterior labrum.            [facility].   11/10/21  EMG  IMPRESSION: Normal EMG and nerve conduction study.     Assessment and Plan    Diagnoses and all orders for this visit:    1.  Adhesive capsulitis of left shoulder (Primary)    2. Numbness and tingling in left hand        Discussed treatment plans with the patient. Patient has been failing conservative management and manipulation. Discussed a left shoulder arthroscopy. Risks and benefits of surgical intervention discussed. She wishes to discuss surgery with her family. She also has the option for injections and continuation of therapy. She will continue conservative management at this time. She will begin physical therapy again.     A work note provided for the patient.       Call or return if symptoms worsen or patient has any concerns.       Scribed for Reji Hernandes MD by Shanique Bowman  12/06/2021   12:51 EST         Follow Up   Return in about 4 weeks (around 1/3/2022).  Patient was given instructions and counseling regarding her condition or for health maintenance advice. Please see specific information pulled into the AVS if appropriate.       I have personally performed the services described in this document as scribed by the above individual and it is both accurate and complete.     Reji Hernandes MD  12/06/21  13:11 EST

## 2021-12-07 ENCOUNTER — TELEPHONE (OUTPATIENT)
Dept: ORTHOPEDIC SURGERY | Facility: CLINIC | Age: 51
End: 2021-12-07

## 2021-12-07 NOTE — TELEPHONE ENCOUNTER
Caller: Jaimie George    Relationship: Self    Best call back number: [phone]    What orders are you requesting (i.e. lab or imaging): PHYSICAL THERAPY ORDERS    In what timeframe would the patient need to come in: ASAP    Where will you receive your lab/imaging services:  [facility] - [phone]

## 2021-12-08 DIAGNOSIS — M75.02 ADHESIVE CAPSULITIS OF LEFT SHOULDER: ICD-10-CM

## 2021-12-27 ENCOUNTER — TELEPHONE (OUTPATIENT)
Dept: NEUROLOGY | Facility: CLINIC | Age: 51
End: 2021-12-27

## 2021-12-27 NOTE — TELEPHONE ENCOUNTER
Caller: Jaimie George    Relationship: Self    Best call back number: [phone]    What form or medical record are you requesting:  EMG RESULTS FROM 11/10/2021    Who is requesting this form or medical record from you:  PT     How would you like to receive the form or medical records (pick-up, mail, fax):   If fax, what is the fax number:[phone]  Timeframe paperwork needed:  ASAP     Additional notes:  PLEASE ADVISE

## 2022-01-10 ENCOUNTER — OFFICE VISIT (OUTPATIENT)
Dept: ORTHOPEDIC SURGERY | Facility: CLINIC | Age: 52
End: 2022-01-10

## 2022-01-10 VITALS — WEIGHT: 153 LBS | BODY MASS INDEX: 26.12 KG/M2 | HEIGHT: 64 IN

## 2022-01-10 DIAGNOSIS — R20.2 NUMBNESS AND TINGLING IN LEFT HAND: ICD-10-CM

## 2022-01-10 DIAGNOSIS — R20.0 NUMBNESS AND TINGLING IN LEFT HAND: ICD-10-CM

## 2022-01-10 DIAGNOSIS — M75.02 ADHESIVE CAPSULITIS OF LEFT SHOULDER: ICD-10-CM

## 2022-01-10 DIAGNOSIS — M75.02 ADHESIVE CAPSULITIS OF LEFT SHOULDER: Primary | ICD-10-CM

## 2022-01-10 PROCEDURE — 99213 OFFICE O/P EST LOW 20 MIN: CPT | Performed by: STUDENT IN AN ORGANIZED HEALTH CARE EDUCATION/TRAINING PROGRAM

## 2022-01-10 NOTE — PROGRESS NOTES
"Chief Complaint  Follow-up of the Left Shoulder    Subjective          Jaimie George presents to [facility] for   History of Present Illness    Jaimie presents today for a follow up of her left shoulder pain. Patient has adhesive capsulitis and had a left shoulder manipulation on 5/14. Patient has been attending physical therapy with continued with pain. Patient explains she got another evaluation of her left shoulder for a second opinion while in [place] for which she was informed of additional treatment plans. Patient was prescribed Gabapentin, but reports that she has itching with the medication. Patient explains she is going to take Gabapentin for one month and follow up with the other provider for reevaluation.         Allergies   Allergen Reactions   • Latex Swelling   • Penicillins Swelling   • Fumaric Acid-K Bromide-Nickel [Traumeel] Rash   • Gabapentin Rash        Social History     Socioeconomic History   • Marital status:    Tobacco Use   • Smoking status: Never Smoker   • Smokeless tobacco: Never Used   Vaping Use   • Vaping Use: Never used   Substance and Sexual Activity   • Alcohol use: Yes     Comment: ocasionaly drinks, 1 drink per day, has been drinking 6-10 years    • Drug use: Never        I reviewed the patient's chief complaint, history of present illness, review of systems, past medical history, surgical history, family history, social history, medications, and allergy list.     REVIEW OF SYSTEMS    Constitutional: Denies fevers, chills, weight loss  Cardiovascular: Denies chest pain, shortness of breath  Skin: Denies rashes, acute skin changes  Neurologic: Denies headache, loss of consciousness  MSK: Left shoulder pain.       Objective   Vital Signs:   Ht 162.6 cm (64\")   Wt 69.4 kg (153 lb)   BMI 26.26 kg/m²     Body mass index is 26.26 kg/m².    Physical Exam    General: Alert. No acute distress.   Left upper extremity: Tenderness over biceps tendon and " AC joint. Active elevation to 90 degrees. ER to 30. Internal rotation to lower lumbar spine. 4/5 pain with resisted shoulder abduction. Sensation intact over the axillary, median, radial, and ulnar nerve distributions. Palpable radial pulse.       Procedures    Imaging Results (Most Recent)     None               Assessment and Plan    Diagnoses and all orders for this visit:    1. Adhesive capsulitis of left shoulder (Primary)    2. Numbness and tingling in left hand        Jaimie presents today for a follow up of her left adhesive capsulitis. Continue with physical therapy. Patient advised to inform physical therapy that she would like to work more on the modalities for her shoulder rather than strengthening at this time. Work note written today for patient to remain off work. Follow up in 6 weeks for reevaluation. No new x-rays needed at next visit.       Call or return if symptoms worsen or patient has any concerns.     Scribed for Reji Hernandes MD by Reji Hernandes MD  01/10/2022   15:25 EST         Follow Up   Return in about 6 weeks (around 2/21/2022).  Patient was given instructions and counseling regarding her condition or for health maintenance advice. Please see specific information pulled into the AVS if appropriate.       I have personally performed the services described in this document as scribed by the above individual and it is both accurate and complete.     Reji Hernandes MD  01/13/22  11:38 EST

## 2022-02-23 ENCOUNTER — OFFICE VISIT (OUTPATIENT)
Dept: ORTHOPEDIC SURGERY | Facility: CLINIC | Age: 52
End: 2022-02-23

## 2022-02-23 VITALS — WEIGHT: 153 LBS | HEIGHT: 64 IN | BODY MASS INDEX: 26.12 KG/M2

## 2022-02-23 DIAGNOSIS — R20.2 NUMBNESS AND TINGLING IN LEFT HAND: ICD-10-CM

## 2022-02-23 DIAGNOSIS — R20.0 NUMBNESS AND TINGLING IN LEFT HAND: ICD-10-CM

## 2022-02-23 DIAGNOSIS — M75.02 ADHESIVE CAPSULITIS OF LEFT SHOULDER: Primary | ICD-10-CM

## 2022-02-23 PROCEDURE — 99213 OFFICE O/P EST LOW 20 MIN: CPT | Performed by: PHYSICIAN ASSISTANT

## 2022-02-23 NOTE — PROGRESS NOTES
"Chief Complaint  Follow-up of the Left Shoulder    Subjective          Jaimie George presents to [facility] for   History of Present Illness    Jaimie presents today for follow-up of her left shoulder.  Patient has left shoulder adhesive capsulitis.  She is currently involved in physical therapy twice a week.  Patient had left shoulder manipulation on 5/14/2021.  She explains that she continues to have left shoulder pain, decreased range of motion, and numbness in her left hand.  She states that she received a \"shot in her tendon \"of her left shoulder about 2 weeks ago.  The orthopedist that she is seen in [place] recently ordered a cervical spine MRI for further evaluation of her numbness.  Patient is a follow-up appointment with a neurosurgeon regarding this MRI.  She currently does not have the MRI completed.  She denies new injuries.    Allergies   Allergen Reactions   • Latex Swelling   • Penicillins Swelling   • Fumaric Acid-K Bromide-Nickel [Traumeel] Rash   • Gabapentin Rash        Social History     Socioeconomic History   • Marital status:    Tobacco Use   • Smoking status: Never Smoker   • Smokeless tobacco: Never Used   Vaping Use   • Vaping Use: Never used   Substance and Sexual Activity   • Alcohol use: Yes     Comment: ocasionaly drinks, 1 drink per day, has been drinking 6-10 years    • Drug use: Never        I reviewed the patient's chief complaint, history of present illness, review of systems, past medical history, surgical history, family history, social history, medications, and allergy list.     REVIEW OF SYSTEMS    Constitutional: Denies fevers, chills, weight loss  Cardiovascular: Denies chest pain, shortness of breath  Skin: Denies rashes, acute skin changes  Neurologic: Denies headache, loss of consciousness  MSK: Left shoulder pain      Objective   Vital Signs:   Ht 162.6 cm (64\")   Wt 69.4 kg (153 lb)   BMI 26.26 kg/m²     Body mass index is 26.26 " kg/m².    Physical Exam    General: Alert.  No acute distress.  Left upper extremity: Tenderness to the biceps tendon and AC joint.  Active forward elevation to 85 degrees.  Internal rotation to the lower lumbar spine.  External rotation to 30 degrees.  Sensation intact axillary, median, radial, ulnar nerve distributions.  Thumb opposition intact.  Palmar adduction of thumb intact.  Positive elbow range of motion.  Palpable radial pulse.    Procedures    Imaging Results (Most Recent)     None                 Assessment and Plan    Diagnoses and all orders for this visit:    1. Adhesive capsulitis of left shoulder (Primary)    2. Numbness and tingling in left hand        Jaimie presents today for follow-up of her left shoulder adhesive capsulitis.  Patient will continue with formal physical therapy.  Continue with home exercises.  Patient to follow-up with neurosurgeon regarding her cervical spine MRI.  Patient will follow up in 6 weeks for reevaluation and to review her MRI results.  Follow-up in 6 weeks.  No new x-rays needed next visit.    Call or return if symptoms worsen or patient has any concerns.     Follow Up   Return in about 6 weeks (around 4/6/2022).  Patient was given instructions and counseling regarding her condition or for health maintenance advice. Please see specific information pulled into the AVS if appropriate.     Trena Wade PA-C  02/23/22  13:12 EST

## 2022-05-02 ENCOUNTER — OFFICE VISIT (OUTPATIENT)
Dept: ORTHOPEDIC SURGERY | Facility: CLINIC | Age: 52
End: 2022-05-02

## 2022-05-02 VITALS — HEART RATE: 80 BPM | BODY MASS INDEX: 26.63 KG/M2 | HEIGHT: 64 IN | OXYGEN SATURATION: 98 % | WEIGHT: 156 LBS

## 2022-05-02 DIAGNOSIS — R20.2 NUMBNESS AND TINGLING IN LEFT HAND: ICD-10-CM

## 2022-05-02 DIAGNOSIS — M75.02 ADHESIVE CAPSULITIS OF LEFT SHOULDER: Primary | ICD-10-CM

## 2022-05-02 DIAGNOSIS — R20.0 NUMBNESS AND TINGLING IN LEFT HAND: ICD-10-CM

## 2022-05-02 PROCEDURE — 99213 OFFICE O/P EST LOW 20 MIN: CPT | Performed by: STUDENT IN AN ORGANIZED HEALTH CARE EDUCATION/TRAINING PROGRAM

## 2022-05-02 NOTE — PROGRESS NOTES
"Chief Complaint  Pain and Follow-up of the Left Shoulder    Subjective          Jaimie George presents to [facility] for   History of Present Illness  The patient presents here today for follow up evaluation of the left shoulder. Patient has left shoulder adhesive capsulitis.  She is currently involved in physical therapy twice a week.  Patient had left shoulder manipulation on 5/14/2021.  She explains that she continues to have left shoulder pain, decreased range of motion, and numbness in her left hand. She has recently had MRI of her c-spine. She has been doing therapy. She has an appointment with a Neurosurgeon. She has had an injection in February without relief.   Allergies   Allergen Reactions   • Latex Swelling   • Penicillins Swelling   • Fumaric Acid-K Bromide-Nickel [Traumeel] Rash   • Gabapentin Rash        Social History     Socioeconomic History   • Marital status:    Tobacco Use   • Smoking status: Never Smoker   • Smokeless tobacco: Never Used   Vaping Use   • Vaping Use: Never used   Substance and Sexual Activity   • Alcohol use: Yes     Comment: ocasionaly drinks, 1 drink per day, has been drinking 6-10 years    • Drug use: Never        I reviewed the patient's chief complaint, history of present illness, review of systems, past medical history, surgical history, family history, social history, medications, and allergy list.     REVIEW OF SYSTEMS    Constitutional: Denies fevers, chills, weight loss  Cardiovascular: Denies chest pain, shortness of breath  Skin: Denies rashes, acute skin changes  Neurologic: Denies headache, loss of consciousness  MSK: Left shoulder pain      Objective   Vital Signs:   Pulse 80   Ht 162.6 cm (64\")   Wt 70.8 kg (156 lb)   SpO2 98%   BMI 26.78 kg/m²     Body mass index is 26.78 kg/m².    Physical Exam    General: Alert. No acute distress.   Left shoulder- Tender to biceps, acromioclavicular joint and lateral shoulder. Forward " elevation 130 with pain. External Rotation 45. Internal rotation to the SI joint. Neurovascularly intact. Sensation intact to light touch axillary, median, radial, ulnar nerve. Positive AIN, PIN, ulnar nerve motor. Positive pulses. 4/5 shoulder Abduction with pain. 4/5 resisted External Rotation with pain. 5/5 subscapularis. Positive impingement test. Paresthesia to the fingers at times. Full active finger ROM. Palpable radial pulse.     Procedures    Imaging Results (Most Recent)     None                   Assessment and Plan    Diagnoses and all orders for this visit:    1. Adhesive capsulitis of left shoulder (Primary)    2. Numbness and tingling in left hand        Discussed the treatment plan with the patient. Her motion in her shoulder is improving with physical therapy. Plan to continue physical therapy. Plan to proceed with Neurosurgeon for her numbness and tingling. Work note given today to remain off work.     Call or return if symptoms worsen or patient has any concerns.       Scribed for Reji Hernandes MD by Layla Hernandez  05/02/2022   09:04 EDT         Follow Up   Follow up in 6 weeks  Patient was given instructions and counseling regarding her condition or for health maintenance advice. Please see specific information pulled into the AVS if appropriate.       I have personally performed the services described in this document as scribed by the above individual and it is both accurate and complete.     Reji Hernandes MD  05/02/22  09:10 EDT

## 2022-06-03 ENCOUNTER — OFFICE VISIT (OUTPATIENT)
Dept: ORTHOPEDIC SURGERY | Facility: CLINIC | Age: 52
End: 2022-06-03

## 2022-06-03 VITALS — WEIGHT: 150 LBS | HEIGHT: 64 IN | HEART RATE: 74 BPM | BODY MASS INDEX: 25.61 KG/M2 | OXYGEN SATURATION: 99 %

## 2022-06-03 DIAGNOSIS — M75.02 ADHESIVE CAPSULITIS OF LEFT SHOULDER: Primary | ICD-10-CM

## 2022-06-03 DIAGNOSIS — G54.0 BRACHIAL PLEXOPATHY: ICD-10-CM

## 2022-06-03 PROCEDURE — 99213 OFFICE O/P EST LOW 20 MIN: CPT | Performed by: STUDENT IN AN ORGANIZED HEALTH CARE EDUCATION/TRAINING PROGRAM

## 2022-06-03 RX ORDER — PREGABALIN 75 MG/1
75 CAPSULE ORAL 2 TIMES DAILY
COMMUNITY
Start: 2022-04-01

## 2022-06-03 RX ORDER — GABAPENTIN 300 MG/1
300 CAPSULE ORAL 3 TIMES DAILY
COMMUNITY
End: 2022-09-12 | Stop reason: SDUPTHER

## 2022-06-03 NOTE — PROGRESS NOTES
"Chief Complaint  Follow-up of the Left Shoulder    Subjective          Jaimie George presents to [facility] for   History of Present Illness    Jaimie returns for follow-up of her left shoulder.  She has left shoulder adhesive capsulitis and brachial plexopathy.  She underwent a shoulder manipulation on 5/14/2021.  She reports her symptoms are the same since her previous evaluation.  She denies any new trauma.  She continues to get radiating pain and numbness into her hand.  This is her primary complaint today.  She is scheduled to see a neurologist for further evaluation.  She is also following up with a surgeon in [place].    Allergies   Allergen Reactions   • Latex Swelling   • Penicillins Swelling   • Fumaric Acid-K Bromide-Nickel [Traumeel] Rash   • Gabapentin Rash        Social History     Socioeconomic History   • Marital status:    Tobacco Use   • Smoking status: Never Smoker   • Smokeless tobacco: Never Used   Vaping Use   • Vaping Use: Never used   Substance and Sexual Activity   • Alcohol use: Not Currently     Comment: ocasionaly drinks, 1 drink per day, has been drinking 6-10 years    • Drug use: Never        I reviewed the patient's chief complaint, history of present illness, review of systems, past medical history, surgical history, family history, social history, medications, and allergy list.     REVIEW OF SYSTEMS    Constitutional: Denies fevers, chills, weight loss  Cardiovascular: Denies chest pain, shortness of breath  Skin: Denies rashes, acute skin changes  Neurologic: Denies headache, loss of consciousness  MSK: Left shoulder pain      Objective   Vital Signs:   Pulse 74   Ht 162.6 cm (64\")   Wt 68 kg (150 lb)   SpO2 99%   BMI 25.75 kg/m²     Body mass index is 25.75 kg/m².    Physical Exam    General: Alert. No acute distress.   Left upper extremity: Diffuse tenderness about the shoulder.  Forward elevation 160 degrees.  External rotation to 60 " degrees.  Internal rotation of the lower lumbar spine.  5 out of 5 resisted shoulder abduction.  Sensation currently intact in the axillary, median, radial, ulnar nerve distributions.  Motor intact with AIN, PIN, ulnar function.  Palpable radial pulse.    Procedures    Imaging Results (Most Recent)     None                   Assessment and Plan        No results found.     Diagnoses and all orders for this visit:    1. Adhesive capsulitis of left shoulder (Primary)  -     Functional Capacity Evaluation; Future    2. Brachial plexopathy  -     Functional Capacity Evaluation; Future        We discussed treatment moving forward.  I agree that her nerve type symptoms seem to be predominant at this point.  I agree with follow-up with the neurologist for further evaluation.  She is asking about work and work restrictions.  I believe a functional capacity evaluation be the next best.  This was ordered today.  Follow-up after the FCE for further discussion.      Call or return if worsening symptoms.    Scribed for Reji Hernandes MD by Reji Hernandes MD  06/03/2022   13:17 EDT         Follow Up   Return for After FCE.  Patient was given instructions and counseling regarding her condition or for health maintenance advice. Please see specific information pulled into the AVS if appropriate.       I have personally performed the services described in this document as scribed by the above individual and it is both accurate and complete.     Reji Hernandes MD  06/03/22  13:19 EDT

## 2022-09-12 ENCOUNTER — OFFICE VISIT (OUTPATIENT)
Dept: ORTHOPEDIC SURGERY | Facility: CLINIC | Age: 52
End: 2022-09-12

## 2022-09-12 VITALS — HEART RATE: 82 BPM | WEIGHT: 149 LBS | OXYGEN SATURATION: 97 % | HEIGHT: 64 IN | BODY MASS INDEX: 25.44 KG/M2

## 2022-09-12 DIAGNOSIS — G54.0 BRACHIAL PLEXOPATHY: ICD-10-CM

## 2022-09-12 DIAGNOSIS — M75.02 ADHESIVE CAPSULITIS OF LEFT SHOULDER: Primary | ICD-10-CM

## 2022-09-12 PROCEDURE — 99214 OFFICE O/P EST MOD 30 MIN: CPT | Performed by: STUDENT IN AN ORGANIZED HEALTH CARE EDUCATION/TRAINING PROGRAM

## 2022-09-12 RX ORDER — GABAPENTIN 300 MG/1
300 CAPSULE ORAL 3 TIMES DAILY
Qty: 90 CAPSULE | Refills: 1 | Status: SHIPPED | OUTPATIENT
Start: 2022-09-12

## 2022-09-12 NOTE — PROGRESS NOTES
"Chief Complaint  Follow-up and Pain of the Left Shoulder    Subjective          Jaimie George presents to [facility] for   History of Present Illness    The patient presents here today for evaluation of the left shoulder. She has left shoulder adhesive capsulitis and brachial plexopathy.  She underwent a shoulder manipulation on 5/14/2021.  She reports her symptoms are the same since her previous evaluation.  She denies any new trauma.  She continues to get radiating pain and numbness into her hand.  This is her primary complaint today.  She is scheduled to see a thoracic specialist for further evaluation.  She was seen by a surgeon in [place] and a neurosurgeon as well and has tried an epidural shot with no relief.   Allergies   Allergen Reactions   • Latex Swelling   • Penicillins Swelling   • Fumaric Acid-K Bromide-Nickel [Traumeel] Rash   • Gabapentin Rash        Social History     Socioeconomic History   • Marital status:    Tobacco Use   • Smoking status: Never Smoker   • Smokeless tobacco: Never Used   Vaping Use   • Vaping Use: Never used   Substance and Sexual Activity   • Alcohol use: Not Currently     Comment: ocasionaly drinks, 1 drink per day, has been drinking 6-10 years    • Drug use: Never   • Sexual activity: Not Currently     Birth control/protection: None        I reviewed the patient's chief complaint, history of present illness, review of systems, past medical history, surgical history, family history, social history, medications, and allergy list.     REVIEW OF SYSTEMS    Constitutional: Denies fevers, chills, weight loss  Cardiovascular: Denies chest pain, shortness of breath  Skin: Denies rashes, acute skin changes  Neurologic: Denies headache, loss of consciousness  MSK: Left shoulder pain       Objective   Vital Signs:   Pulse 82   Ht 162.6 cm (64\")   Wt 67.6 kg (149 lb)   SpO2 97%   BMI 25.58 kg/m²     Body mass index is 25.58 kg/m².    Physical " Exam    General: Alert. No acute distress.   Left upper extremity: Diffuse tenderness about the shoulder.  Forward elevation 90 degrees passive 145 degrees.  External rotation to 45 degrees.  Internal rotation of the lower lumbar spine.  5 out of 5 resisted shoulder abduction.  decreased light sensation.  Motor intact with AIN, PIN, ulnar function.  Palpable radial pulse. Tender to anterior chest wall. 5/5 rotator cuff testing with pain.    Procedures    Imaging Results (Most Recent)     None                   Assessment and Plan        No results found.     Diagnoses and all orders for this visit:    1. Adhesive capsulitis of left shoulder (Primary)    2. Brachial plexopathy        Discussed the treatment plan with the patient. Plan to continue current treatment. Plan to proceed with the Thoracic specialist. Prescription for gabapentin given today. Plan to continue physical therapy.     Call or return if worsening symptoms.    Scribed for Reji Hernandes MD by Layla Hernandez  09/12/2022   14:42 EDT         Follow Up   Return in about 6 months (around 3/12/2023).  Patient was given instructions and counseling regarding her condition or for health maintenance advice. Please see specific information pulled into the AVS if appropriate.       I have personally performed the services described in this document as scribed by the above individual and it is both accurate and complete.     Reji Hernandes MD  09/12/22  14:48 EDT

## 2023-03-20 ENCOUNTER — OFFICE VISIT (OUTPATIENT)
Dept: ORTHOPEDIC SURGERY | Facility: CLINIC | Age: 53
End: 2023-03-20
Payer: COMMERCIAL

## 2023-03-20 VITALS — BODY MASS INDEX: 25.27 KG/M2 | WEIGHT: 148 LBS | HEIGHT: 64 IN

## 2023-03-20 DIAGNOSIS — M75.112 INCOMPLETE TEAR OF LEFT ROTATOR CUFF, UNSPECIFIED WHETHER TRAUMATIC: Primary | ICD-10-CM

## 2023-03-20 DIAGNOSIS — M75.52 ACUTE BURSITIS OF LEFT SHOULDER: ICD-10-CM

## 2023-03-20 DIAGNOSIS — G54.0 BRACHIAL PLEXOPATHY: ICD-10-CM

## 2023-03-20 DIAGNOSIS — R20.2 NUMBNESS AND TINGLING IN LEFT HAND: ICD-10-CM

## 2023-03-20 DIAGNOSIS — R20.0 NUMBNESS AND TINGLING IN LEFT HAND: ICD-10-CM

## 2023-03-20 DIAGNOSIS — M75.02 ADHESIVE CAPSULITIS OF LEFT SHOULDER: ICD-10-CM

## 2023-03-20 PROCEDURE — 99213 OFFICE O/P EST LOW 20 MIN: CPT | Performed by: STUDENT IN AN ORGANIZED HEALTH CARE EDUCATION/TRAINING PROGRAM

## 2023-03-20 NOTE — PROGRESS NOTES
"Chief Complaint  Pain and Follow-up of the Left Shoulder     Subjective      Jaimie George presents to [facility] for follow up evaluation of the left shoulder. The patient recently had an MRI and is here today to discuss those results. To review, She has left shoulder adhesive capsulitis and brachial plexopathy.  She underwent a shoulder manipulation on 5/14/2021.  She reports her symptoms are the same since her previous evaluation.  She denies any new trauma.  She continues to get radiating pain and numbness into her hand.  This is her primary complaint today.  She was seen by a surgeon in [place] and a neurosurgeon as well and has tried an epidural shot with no relief.      Allergies   Allergen Reactions   • Latex Swelling   • Penicillins Swelling   • Fumaric Acid-K Bromide-Nickel [Traumeel] Rash   • Gabapentin Rash        Social History     Socioeconomic History   • Marital status:    Tobacco Use   • Smoking status: Never   • Smokeless tobacco: Never   Vaping Use   • Vaping Use: Never used   Substance and Sexual Activity   • Alcohol use: Not Currently     Comment: ocasionaly drinks, 1 drink per day, has been drinking 6-10 years    • Drug use: Never   • Sexual activity: Not Currently     Birth control/protection: None        Review of Systems     Objective   Vital Signs:   Ht 162.6 cm (64\")   Wt 67.1 kg (148 lb)   BMI 25.40 kg/m²       Physical Exam  Constitutional:       Appearance: Normal appearance. The patient is well-developed and normal weight.   HENT:      Head: Normocephalic.      Right Ear: Hearing and external ear normal.      Left Ear: Hearing and external ear normal.      Nose: Nose normal.   Eyes:      Conjunctiva/sclera: Conjunctivae normal.   Cardiovascular:      Rate and Rhythm: Normal rate.   Pulmonary:      Effort: Pulmonary effort is normal.      Breath sounds: No wheezing or rales.   Abdominal:      Palpations: Abdomen is soft.      Tenderness: There is " no abdominal tenderness.   Musculoskeletal:      Cervical back: Normal range of motion.   Skin:     Findings: No rash.   Neurological:      Mental Status: The patient is alert and oriented to person, place, and time.   Psychiatric:         Mood and Affect: Mood and affect normal.         Judgment: Judgment normal.       Ortho Exam      Left upper extremity: Tender to the biceps, acromioclavicular joint and posterior joint line. Forward elevation 100 degrees, passive 145 degrees. External Rotation 45. Internal rotation to the lower lumbar spine. 5/5 supraspinatus strength with anterior pain. 5/5 infraspinatus and subscapularis. Positive impingement signs. Anterior pain with cross arm adduction. paresthesia in the hand with full motor function.     Procedures      Imaging Results (Most Recent)     None           Result Review :       No results found.           Assessment and Plan     Diagnoses and all orders for this visit:    1. Incomplete tear of left rotator cuff, unspecified whether traumatic (Primary)    2. Acute bursitis of left shoulder    3. Adhesive capsulitis of left shoulder    4. Brachial plexopathy    5. Numbness and tingling in left hand        Discussed the treatment plan with the patient. Plan to continue home exercises and therapy. The patient expressed understanding and wished to proceed.     Call or return if worsening symptoms.    Follow Up     PRN      Patient was given instructions and counseling regarding her condition or for health maintenance advice. Please see specific information pulled into the AVS if appropriate.     Scribed for Reji Hernandes MD by Layla Hernandez.  03/20/23   10:23 EDT